# Patient Record
Sex: FEMALE | Race: WHITE | NOT HISPANIC OR LATINO
[De-identification: names, ages, dates, MRNs, and addresses within clinical notes are randomized per-mention and may not be internally consistent; named-entity substitution may affect disease eponyms.]

---

## 2017-03-22 ENCOUNTER — OTHER (OUTPATIENT)
Age: 72
End: 2017-03-22

## 2017-03-22 DIAGNOSIS — M25.569 PAIN IN UNSPECIFIED KNEE: ICD-10-CM

## 2017-03-30 ENCOUNTER — APPOINTMENT (OUTPATIENT)
Dept: ORTHOPEDIC SURGERY | Facility: CLINIC | Age: 72
End: 2017-03-30

## 2017-03-30 VITALS
HEART RATE: 80 BPM | DIASTOLIC BLOOD PRESSURE: 79 MMHG | WEIGHT: 125 LBS | BODY MASS INDEX: 23 KG/M2 | HEIGHT: 62 IN | SYSTOLIC BLOOD PRESSURE: 158 MMHG

## 2017-03-30 DIAGNOSIS — Z78.9 OTHER SPECIFIED HEALTH STATUS: ICD-10-CM

## 2017-03-30 DIAGNOSIS — Z80.42 FAMILY HISTORY OF MALIGNANT NEOPLASM OF PROSTATE: ICD-10-CM

## 2017-03-30 DIAGNOSIS — Z80.8 FAMILY HISTORY OF MALIGNANT NEOPLASM OF OTHER ORGANS OR SYSTEMS: ICD-10-CM

## 2017-03-30 DIAGNOSIS — Z86.39 PERSONAL HISTORY OF OTHER ENDOCRINE, NUTRITIONAL AND METABOLIC DISEASE: ICD-10-CM

## 2017-03-30 DIAGNOSIS — Z87.891 PERSONAL HISTORY OF NICOTINE DEPENDENCE: ICD-10-CM

## 2017-03-30 DIAGNOSIS — Z87.39 PERSONAL HISTORY OF OTHER DISEASES OF THE MUSCULOSKELETAL SYSTEM AND CONNECTIVE TISSUE: ICD-10-CM

## 2017-04-10 ENCOUNTER — FORM ENCOUNTER (OUTPATIENT)
Age: 72
End: 2017-04-10

## 2017-04-11 ENCOUNTER — OUTPATIENT (OUTPATIENT)
Dept: OUTPATIENT SERVICES | Facility: HOSPITAL | Age: 72
LOS: 1 days | End: 2017-04-11
Payer: MEDICARE

## 2017-04-11 ENCOUNTER — APPOINTMENT (OUTPATIENT)
Dept: MRI IMAGING | Facility: CLINIC | Age: 72
End: 2017-04-11

## 2017-04-11 DIAGNOSIS — Z90.89 ACQUIRED ABSENCE OF OTHER ORGANS: Chronic | ICD-10-CM

## 2017-04-11 DIAGNOSIS — M25.569 PAIN IN UNSPECIFIED KNEE: ICD-10-CM

## 2017-04-11 DIAGNOSIS — Z90.81 ACQUIRED ABSENCE OF SPLEEN: Chronic | ICD-10-CM

## 2017-04-11 DIAGNOSIS — Z98.89 OTHER SPECIFIED POSTPROCEDURAL STATES: Chronic | ICD-10-CM

## 2017-04-11 PROCEDURE — 73721 MRI JNT OF LWR EXTRE W/O DYE: CPT

## 2017-04-12 ENCOUNTER — APPOINTMENT (OUTPATIENT)
Dept: MRI IMAGING | Facility: CLINIC | Age: 72
End: 2017-04-12

## 2017-04-14 ENCOUNTER — OTHER (OUTPATIENT)
Age: 72
End: 2017-04-14

## 2017-04-24 ENCOUNTER — APPOINTMENT (OUTPATIENT)
Dept: VASCULAR SURGERY | Facility: CLINIC | Age: 72
End: 2017-04-24

## 2017-04-24 VITALS
TEMPERATURE: 97.9 F | OXYGEN SATURATION: 95 % | DIASTOLIC BLOOD PRESSURE: 91 MMHG | HEART RATE: 82 BPM | HEIGHT: 62 IN | WEIGHT: 126 LBS | SYSTOLIC BLOOD PRESSURE: 157 MMHG | RESPIRATION RATE: 16 BRPM | BODY MASS INDEX: 23.19 KG/M2

## 2017-06-12 ENCOUNTER — APPOINTMENT (OUTPATIENT)
Dept: VASCULAR SURGERY | Facility: CLINIC | Age: 72
End: 2017-06-12

## 2017-06-12 VITALS
DIASTOLIC BLOOD PRESSURE: 76 MMHG | OXYGEN SATURATION: 98 % | SYSTOLIC BLOOD PRESSURE: 119 MMHG | RESPIRATION RATE: 16 BRPM | HEART RATE: 76 BPM | WEIGHT: 126.03 LBS | TEMPERATURE: 98.2 F | BODY MASS INDEX: 23.19 KG/M2 | HEIGHT: 62 IN

## 2017-06-29 ENCOUNTER — APPOINTMENT (OUTPATIENT)
Dept: VASCULAR SURGERY | Facility: CLINIC | Age: 72
End: 2017-06-29

## 2017-06-29 VITALS
HEIGHT: 62 IN | TEMPERATURE: 98.3 F | OXYGEN SATURATION: 98 % | BODY MASS INDEX: 22.82 KG/M2 | RESPIRATION RATE: 16 BRPM | WEIGHT: 124 LBS | HEART RATE: 83 BPM | DIASTOLIC BLOOD PRESSURE: 80 MMHG | SYSTOLIC BLOOD PRESSURE: 151 MMHG

## 2017-07-24 ENCOUNTER — APPOINTMENT (OUTPATIENT)
Dept: VASCULAR SURGERY | Facility: CLINIC | Age: 72
End: 2017-07-24

## 2017-07-24 VITALS
WEIGHT: 126 LBS | OXYGEN SATURATION: 97 % | TEMPERATURE: 97.9 F | HEART RATE: 88 BPM | HEIGHT: 62 IN | BODY MASS INDEX: 23.19 KG/M2 | RESPIRATION RATE: 15 BRPM | SYSTOLIC BLOOD PRESSURE: 119 MMHG | DIASTOLIC BLOOD PRESSURE: 72 MMHG

## 2017-08-14 ENCOUNTER — APPOINTMENT (OUTPATIENT)
Dept: VASCULAR SURGERY | Facility: CLINIC | Age: 72
End: 2017-08-14
Payer: MEDICARE

## 2017-08-14 VITALS
OXYGEN SATURATION: 98 % | RESPIRATION RATE: 15 BRPM | HEART RATE: 80 BPM | DIASTOLIC BLOOD PRESSURE: 76 MMHG | WEIGHT: 127 LBS | TEMPERATURE: 97.9 F | SYSTOLIC BLOOD PRESSURE: 130 MMHG | BODY MASS INDEX: 23.37 KG/M2 | HEIGHT: 62 IN

## 2017-08-14 DIAGNOSIS — I83.811 VARICOSE VEINS OF RIGHT LOWER EXTREMITY WITH PAIN: ICD-10-CM

## 2017-08-14 PROCEDURE — 36471 NJX SCLRSNT MLT INCMPTNT VN: CPT | Mod: RT

## 2017-08-15 PROBLEM — I83.811 VARICOSE VEINS WITH PAIN, RIGHT: Status: ACTIVE | Noted: 2017-04-24

## 2017-09-05 ENCOUNTER — APPOINTMENT (OUTPATIENT)
Dept: VASCULAR SURGERY | Facility: CLINIC | Age: 72
End: 2017-09-05

## 2020-08-20 ENCOUNTER — APPOINTMENT (OUTPATIENT)
Dept: NEUROLOGY | Facility: CLINIC | Age: 75
End: 2020-08-20
Payer: MEDICARE

## 2020-08-20 VITALS — BODY MASS INDEX: 20.61 KG/M2 | WEIGHT: 112 LBS | TEMPERATURE: 97.7 F | HEIGHT: 62 IN

## 2020-08-20 DIAGNOSIS — G93.9 DISORDER OF BRAIN, UNSPECIFIED: ICD-10-CM

## 2020-08-20 PROCEDURE — 99204 OFFICE O/P NEW MOD 45 MIN: CPT

## 2020-08-20 RX ORDER — LEVOFLOXACIN 500 MG/1
500 TABLET, FILM COATED ORAL DAILY
Qty: 10 | Refills: 0 | Status: DISCONTINUED | COMMUNITY
Start: 2016-12-14 | End: 2020-08-20

## 2020-08-20 RX ORDER — CHLORHEXIDINE GLUCONATE, 0.12% ORAL RINSE 1.2 MG/ML
0.12 SOLUTION DENTAL
Qty: 1419 | Refills: 0 | Status: DISCONTINUED | COMMUNITY
Start: 2017-03-16 | End: 2020-08-20

## 2020-08-20 RX ORDER — ATORVASTATIN CALCIUM 40 MG/1
40 TABLET, FILM COATED ORAL
Qty: 30 | Refills: 0 | Status: DISCONTINUED | COMMUNITY
Start: 2016-09-19 | End: 2020-08-20

## 2020-08-20 RX ORDER — AMOXICILLIN 250 MG/1
250 CAPSULE ORAL
Qty: 30 | Refills: 0 | Status: DISCONTINUED | COMMUNITY
Start: 2017-03-16 | End: 2020-08-20

## 2020-08-20 RX ORDER — AMOXICILLIN AND CLAVULANATE POTASSIUM 875; 125 MG/1; MG/1
875-125 TABLET, COATED ORAL
Qty: 20 | Refills: 0 | Status: DISCONTINUED | COMMUNITY
Start: 2017-02-07 | End: 2020-08-20

## 2020-08-20 RX ORDER — ACETAMINOPHEN AND CODEINE 300; 30 MG/1; MG/1
300-30 TABLET ORAL
Qty: 20 | Refills: 0 | Status: DISCONTINUED | COMMUNITY
Start: 2017-03-16 | End: 2020-08-20

## 2021-01-15 ENCOUNTER — NON-APPOINTMENT (OUTPATIENT)
Age: 76
End: 2021-01-15

## 2021-02-24 ENCOUNTER — NON-APPOINTMENT (OUTPATIENT)
Age: 76
End: 2021-02-24

## 2021-02-24 ENCOUNTER — APPOINTMENT (OUTPATIENT)
Dept: GASTROENTEROLOGY | Facility: CLINIC | Age: 76
End: 2021-02-24
Payer: MEDICARE

## 2021-02-24 VITALS
HEART RATE: 78 BPM | TEMPERATURE: 98 F | BODY MASS INDEX: 21.99 KG/M2 | WEIGHT: 112 LBS | SYSTOLIC BLOOD PRESSURE: 130 MMHG | DIASTOLIC BLOOD PRESSURE: 99 MMHG | HEIGHT: 60 IN

## 2021-02-24 DIAGNOSIS — R19.7 DIARRHEA, UNSPECIFIED: ICD-10-CM

## 2021-02-24 PROCEDURE — 99072 ADDL SUPL MATRL&STAF TM PHE: CPT

## 2021-02-24 PROCEDURE — 99204 OFFICE O/P NEW MOD 45 MIN: CPT

## 2021-02-24 RX ORDER — BISMUTH SUBSALICYLATE 262 MG
TABLET,CHEWABLE ORAL
Refills: 0 | Status: ACTIVE | COMMUNITY

## 2021-02-24 RX ORDER — ATORVASTATIN CALCIUM 80 MG/1
TABLET, FILM COATED ORAL
Refills: 0 | Status: ACTIVE | COMMUNITY

## 2021-02-24 RX ORDER — IRON/IRON ASP GLY/FA/MV-MIN 38 125-25-1MG
TABLET ORAL
Refills: 0 | Status: ACTIVE | COMMUNITY

## 2021-02-24 RX ORDER — MONTELUKAST SODIUM 10 MG/1
TABLET, FILM COATED ORAL
Refills: 0 | Status: ACTIVE | COMMUNITY

## 2021-02-24 NOTE — HISTORY OF PRESENT ILLNESS
[FreeTextEntry1] : 85-year-old white female with history of diverticulosis and external hemorrhoids.  Her colonoscopies were done in 2007 and 2012 showing diverticulosis and external hemorrhoids.  She has a family history that is negative for colon cancer.  Recently identified as iron deficiency anemia.  History of hyperlipidemia on Lipitor;  history of nonlocked Hodgkin's lymphoma treated distantly 10 years ago with Rituxan.  For a relatively short course.  Hyperlipidemia treated with Lipitor and Singulair for asthma.\par Currently complaining of moderate weight loss companied by diarrhea.  Diarrhea is occurring about 4-5 times per day.  It seems to be triggered by intake of milk ice cream and shakes.  There are no associated cramps or bloating.  Diet remains good.  She has not had any recent antibiotics or foreign travel.  She denies having any rectal bleeding hemorrhoids abdominal pain distantly she was treated by Dr. Romano for non-Hodgkin's lymphoma with Rituxan on 4 occasions.

## 2021-02-24 NOTE — CONSULT LETTER
[Dear  ___] : Dear  [unfilled], [Consult Letter:] : I had the pleasure of evaluating your patient, [unfilled]. [Please see my note below.] : Please see my note below. [Referral Closing:] : Thank you very much for seeing this patient.  If you have any questions, please do not hesitate to contact me. [FreeTextEntry1] : Of acute diarrhea possibly due to lactose intolerance but additional diagnoses are still possible.  Patient will employ a lactose-free diet and if symptoms remain then further work-up indicated.  Would include blood work and stool studies and GI office reevaluation for possible colonoscopy. [Sincerely,] : Sincerely, [FreeTextEntry3] : Minor Rosenthal MD FACG\par Diplomate American Board of Internal Medicine and Gastroenterolgy\par [facility]\par

## 2021-02-24 NOTE — ASSESSMENT
[FreeTextEntry1] : 75-year-old white female with history of non-Hodgkin's lymphoma treated and resolved 10 years ago.  No with alleged iron deficiency anemia.  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  Her diarrhea symptoms are consistent with that of lactose intolerance.  Patient will avoid milk and switch to lactose-free milk.  I will of use of Lactaid tablets.  Future blood work requested to include ESR and CRP plus CBC CMP, TSH, T3-T4..  Stool studies for C. difficile, CNS, O&P, Giardia, Cruz stain.\par GI office follow-up in 2 months.\par Last endoscopy in 2008 showed small gastric polyps which were benign on pathology.\par Prior colonoscopies in 2007 and 2012 just showed diverticulosis and external hemorrhoids.  Considering repeat colonoscopy if above evaluation is negative and symptoms persist.

## 2021-02-24 NOTE — PHYSICAL EXAM
[General Appearance - Alert] : alert [General Appearance - In No Acute Distress] : in no acute distress [Sclera] : the sclera and conjunctiva were normal [PERRL With Normal Accommodation] : pupils were equal in size, round, and reactive to light [Extraocular Movements] : extraocular movements were intact [Oropharynx] : the oropharynx was normal [Outer Ear] : the ears and nose were normal in appearance [Neck Appearance] : the appearance of the neck was normal [Jugular Venous Distention Increased] : there was no jugular-venous distention [Neck Cervical Mass (___cm)] : no neck mass was observed [Thyroid Diffuse Enlargement] : the thyroid was not enlarged [Thyroid Nodule] : there were no palpable thyroid nodules [Auscultation Breath Sounds / Voice Sounds] : lungs were clear to auscultation bilaterally [Heart Rate And Rhythm] : heart rate was normal and rhythm regular [Heart Sounds] : normal S1 and S2 [Murmurs] : no murmurs [Heart Sounds Gallop] : no gallops [Heart Sounds Pericardial Friction Rub] : no pericardial rub [Bowel Sounds] : normal bowel sounds [Abdomen Tenderness] : non-tender [Abdomen Soft] : soft [] : no hepato-splenomegaly [Abdomen Mass (___ Cm)] : no abdominal mass palpated [FreeTextEntry1] : \par Surgical scar.  No hernia.  No adenopathy.  No ascites. [Cervical Lymph Nodes Enlarged Posterior Bilaterally] : posterior cervical [Cervical Lymph Nodes Enlarged Anterior Bilaterally] : anterior cervical [Axillary Lymph Nodes Enlarged Bilaterally] : axillary [Supraclavicular Lymph Nodes Enlarged Bilaterally] : supraclavicular [Femoral Lymph Nodes Enlarged Bilaterally] : femoral [Inguinal Lymph Nodes Enlarged Bilaterally] : inguinal

## 2021-02-24 NOTE — REASON FOR VISIT
[Consultation] : a consultation visit [FreeTextEntry1] : Subacute diarrhea.  Iron deficiency anemia.

## 2021-05-07 ENCOUNTER — APPOINTMENT (OUTPATIENT)
Dept: GASTROENTEROLOGY | Facility: CLINIC | Age: 76
End: 2021-05-07

## 2022-04-17 ENCOUNTER — EMERGENCY (EMERGENCY)
Facility: HOSPITAL | Age: 77
LOS: 1 days | Discharge: HOME | End: 2022-04-17
Attending: EMERGENCY MEDICINE
Payer: MEDICARE

## 2022-04-17 VITALS
DIASTOLIC BLOOD PRESSURE: 95 MMHG | RESPIRATION RATE: 18 BRPM | WEIGHT: 130.07 LBS | HEIGHT: 64 IN | SYSTOLIC BLOOD PRESSURE: 165 MMHG | TEMPERATURE: 98 F | OXYGEN SATURATION: 100 % | HEART RATE: 80 BPM

## 2022-04-17 DIAGNOSIS — Z90.81 ACQUIRED ABSENCE OF SPLEEN: Chronic | ICD-10-CM

## 2022-04-17 DIAGNOSIS — Z98.89 OTHER SPECIFIED POSTPROCEDURAL STATES: Chronic | ICD-10-CM

## 2022-04-17 DIAGNOSIS — Z90.89 ACQUIRED ABSENCE OF OTHER ORGANS: Chronic | ICD-10-CM

## 2022-04-17 LAB
BASOPHILS # BLD AUTO: 0.07 K/UL — SIGNIFICANT CHANGE UP (ref 0–0.2)
BASOPHILS NFR BLD AUTO: 0.5 % — SIGNIFICANT CHANGE UP (ref 0–2)
EOSINOPHIL # BLD AUTO: 0.03 K/UL — SIGNIFICANT CHANGE UP (ref 0–0.5)
EOSINOPHIL NFR BLD AUTO: 0.2 % — SIGNIFICANT CHANGE UP (ref 0–6)
HCT VFR BLD CALC: 37.7 % — SIGNIFICANT CHANGE UP (ref 34.5–45)
HGB BLD-MCNC: 12 G/DL — SIGNIFICANT CHANGE UP (ref 11.5–15.5)
IMM GRANULOCYTES NFR BLD AUTO: 0.4 % — SIGNIFICANT CHANGE UP (ref 0–1.5)
LYMPHOCYTES # BLD AUTO: 0.85 K/UL — LOW (ref 1–3.3)
LYMPHOCYTES # BLD AUTO: 6.4 % — LOW (ref 13–44)
MCHC RBC-ENTMCNC: 31.8 GM/DL — LOW (ref 32–36)
MCHC RBC-ENTMCNC: 32.2 PG — SIGNIFICANT CHANGE UP (ref 27–34)
MCV RBC AUTO: 101.1 FL — HIGH (ref 80–100)
MONOCYTES # BLD AUTO: 0.38 K/UL — SIGNIFICANT CHANGE UP (ref 0–0.9)
MONOCYTES NFR BLD AUTO: 2.8 % — SIGNIFICANT CHANGE UP (ref 2–14)
NEUTROPHILS # BLD AUTO: 11.96 K/UL — HIGH (ref 1.8–7.4)
NEUTROPHILS NFR BLD AUTO: 89.7 % — HIGH (ref 43–77)
PLATELET # BLD AUTO: 168 K/UL — SIGNIFICANT CHANGE UP (ref 150–400)
RBC # BLD: 3.73 M/UL — LOW (ref 3.8–5.2)
RBC # FLD: 13.3 % — SIGNIFICANT CHANGE UP (ref 10.3–14.5)
WBC # BLD: 13.35 K/UL — HIGH (ref 3.8–10.5)
WBC # FLD AUTO: 13.35 K/UL — HIGH (ref 3.8–10.5)

## 2022-04-17 PROCEDURE — 99284 EMERGENCY DEPT VISIT MOD MDM: CPT

## 2022-04-17 RX ORDER — SODIUM CHLORIDE 9 MG/ML
1000 INJECTION INTRAMUSCULAR; INTRAVENOUS; SUBCUTANEOUS ONCE
Refills: 0 | Status: COMPLETED | OUTPATIENT
Start: 2022-04-17 | End: 2022-04-17

## 2022-04-17 RX ORDER — ONDANSETRON 8 MG/1
4 TABLET, FILM COATED ORAL ONCE
Refills: 0 | Status: COMPLETED | OUTPATIENT
Start: 2022-04-17 | End: 2022-04-17

## 2022-04-17 RX ADMIN — ONDANSETRON 4 MILLIGRAM(S): 8 TABLET, FILM COATED ORAL at 20:35

## 2022-04-17 RX ADMIN — SODIUM CHLORIDE 2000 MILLILITER(S): 9 INJECTION INTRAMUSCULAR; INTRAVENOUS; SUBCUTANEOUS at 22:59

## 2022-04-17 NOTE — ED PROVIDER NOTE - NSFOLLOWUPINSTRUCTIONS_ED_ALL_ED_FT
Preventing Marijuana Misuse      Marijuana is a mixture of the dried leaves and flowers of the hemp plant Cannabis sativa. The plant's active ingredients (cannabinoids) change the chemistry of the brain. If you smoke or eat marijuana, you will experience changes in the way you think, feel, and behave.      What is marijuana used for?    In some cases, marijuana is prescribed by a health care provider (medical marijuana) for temporary relief from a medical condition. It can have medical effects, such as:  •Reduced nausea.       •Increased appetite.       •Reduced muscle spasm.       •Pain relief.      •Anxiety relief.      Many people use marijuana for recreational purposes because it helps them relax and puts them in a pleasurable mood (marijuana high).      How can marijuana use affect me?    Marijuana affects you both mentally and physically. Using marijuana can make you feel high and relaxed. It can also have negative effects, both short term and long term. When used for recreational purposes, marijuana is often used in higher doses and for longer periods. High doses of marijuana can cause unpleasant side effects. It is also possible to become addicted to this drug.    Short-term effects of marijuana use include:  •Changes in mood and perception, such as an altered sense of time.      •Increased heart rate.      •Increased appetite.      •Slowed movement, coordination, and reaction time.      •Poor memory, judgment, and problem-solving ability.      •Drowsiness.      •Bloodshot eyes and changes in vision.      •Coughing.      High doses of marijuana can cause:  •Panic.       •Anxiety.       •Mental confusion.      •Severe dizziness.      •Vomiting.      •Hallucinations. This means you see, hear, taste, smell, or feel things that are not real.      •Coma.        What can happen if I keep using marijuana?    If you use marijuana for a long time, it can have long-term effects such as:•Higher risk of health problems, such as:   •Lung and breathing problems.       •Possible higher risk of heart and cardiovascular problems or testicular cancer.        •Mental and physical dependence (addiction).      •Slowed brain development in young people. Babies whose mothers used marijuana during pregnancy may have an increased risk of problems with brain development and behavior.      •Hallucinations or temporary periods of false perceptions or beliefs (paranoia).      •Worsening of mental illness or onset of new mental illness. This can include anxiety, depression, or suicidal thoughts.      •Difficulty maintaining healthy relationships.       •Poor memory and difficulty concentrating and learning. This can result in decreased intelligence, poor performance at school or work, and an increased risk of dropping out of school.      •Higher risk of using other substances like alcohol, nicotine, and illegal drugs.      •A condition called cannabinoid hyperemesis syndrome. This causes repeated episodes of intense abdominal pain, nausea, and vomiting.      Quitting marijuana after using it for a long time can cause withdrawal symptoms, such as:  •Headache.       •Shakiness.       •Cravings for the drug.       •Sweating.       •Stomach pain, nausea, and vomiting.      •Restlessness and trouble sleeping.       •Anxiety, irritability, and anger.       •Decreased appetite.        What are the benefits of not using marijuana?    Not using marijuana can keep you from becoming dependent on it. You can avoid the drug's negative effects on your quality of life. You can avoid accidents caused by the slowed reaction time and decreased thinking ability that are common with marijuana use and abuse. You can also prevent the long-term effects that this drug can cause.      What actions can I take to stop using marijuana?     If you are not physically or mentally dependent on marijuana, you should be able to stop using it on your own. Taking these actions may help:  •Find healthy ways to cope with stress, such as exercise, meditation, or spending time with family and friends. Talk with your health care provider about how you feel and how to cope with stress.       •Spend time with people who do not use marijuana, or make new friends who do not use marijuana.       •Do something else instead of using marijuana. You can exercise, take up a hobby, or participate in activities that you can do with others.       •Do not be afraid to say no if someone offers you marijuana. Speak up about why you do not want to use drugs. You can be a positive role model for others.      If you cannot stop on your own, ask your health care provider for help. Treatment for marijuana addiction is similar to treatment for other addictions. It may include:  •Cognitive-behavioral therapy (psychotherapy). This may include individual or group therapy.      •Joining a support group.       •Treating medical, behavioral, or mental health conditions that exist along with marijuana dependency.        Where to find more information    Learn more about:  •Marijuana from the [place] on Drug Abuse: www.drugabuse.gov      •Medical marijuana from the National Institutes of Health: nccih.nih.gov      •Treatment options from the Substance Abuse and Mental Health Services Administration: [facility].gov      •Recovery from marijuana dependency from Recovery.org: www.recovery.org        Contact a health care provider if:    •You want to stop using marijuana but you cannot.      •You have withdrawal symptoms when you try to stop using marijuana.      •You are using marijuana every day.      •You need to use increasing amounts of marijuana to get the same desired effect.      •You are using marijuana along with other drugs like cocaine or alcohol.      •You have anxiety or depression.      •You have hallucinations or paranoia.      •Marijuana use is interfering with your relationships or your ability to function normally at school or at work.        Get help right away if:    •You develop severe chest pain, dizziness, or shortness of breath.      •You have severe abdominal pain, nausea, and vomiting.        Summary    •Using marijuana can make you feel high and relaxed, and if used properly, it can have some medical benefits. However, it can also have negative effects, both short term and long term.      •High doses of marijuana can cause unpleasant side effects. These can be both physical and mental.      •Marijuana has the potential to cause physical dependence and even addiction.      •Long-term use may interfere with your ability to function normally at home, school, or work. It can sometimes lead to using other substances like alcohol, nicotine, and illegal drugs.      •If you need help to stop using marijuana, ask your health care provider. Marijuana addiction can be treated.      This information is not intended to replace advice given to you by your health care provider. Make sure you discuss any questions you have with your health care provider.

## 2022-04-17 NOTE — ED ADULT TRIAGE NOTE - CHIEF COMPLAINT QUOTE
pt BIBA after eating a chocolate edible from grandson. pt reports then started to vomit and feel tired. pt a&ox3

## 2022-04-17 NOTE — ED PROVIDER NOTE - PATIENT PORTAL LINK FT
You can access the FollowMyHealth Patient Portal offered by [facility] by registering at the following website: [email or web address]. By joining [facility]’s FollowMyHealth portal, you will also be able to view your health information using other applications (apps) compatible with our system.

## 2022-04-17 NOTE — ED ADULT NURSE NOTE - OBJECTIVE STATEMENT
76 year old female presents to ED for evaluation after ingesting a "edible" that she got from her grandson.  A&Ox4  Presents now with complaint of dizziness.  Seen and evaluated by provider, awaiting orders.  No acute distress noted at this time.   at bedside.  Offers no complaints.

## 2022-04-17 NOTE — ED PROVIDER NOTE - PROGRESS NOTE DETAILS
JK - VSS, resting comfortably, in no apparent distress. Ambulating on her own without assistance, tolerating PO. CBC, CMP WNL. aaox3, no FND, no ataxia. Ready and agreeable to DC with return precautions.

## 2022-04-17 NOTE — ED ADULT NURSE NOTE - NSICDXPASTMEDICALHX_GEN_ALL_CORE_FT
PAST MEDICAL HISTORY:  Diverticulitis     High cholesterol     Lymphoma in remission 5 years    Simple chronic bronchitis

## 2022-04-18 VITALS
TEMPERATURE: 98 F | SYSTOLIC BLOOD PRESSURE: 142 MMHG | OXYGEN SATURATION: 94 % | RESPIRATION RATE: 18 BRPM | HEART RATE: 86 BPM | DIASTOLIC BLOOD PRESSURE: 80 MMHG

## 2022-04-18 LAB
ANION GAP SERPL CALC-SCNC: 12 MMOL/L — SIGNIFICANT CHANGE UP (ref 5–17)
BUN SERPL-MCNC: 14.5 MG/DL — SIGNIFICANT CHANGE UP (ref 8–20)
CALCIUM SERPL-MCNC: 8.7 MG/DL — SIGNIFICANT CHANGE UP (ref 8.6–10.2)
CHLORIDE SERPL-SCNC: 105 MMOL/L — SIGNIFICANT CHANGE UP (ref 98–107)
CO2 SERPL-SCNC: 26 MMOL/L — SIGNIFICANT CHANGE UP (ref 22–29)
CREAT SERPL-MCNC: 0.41 MG/DL — LOW (ref 0.5–1.3)
EGFR: 101 ML/MIN/1.73M2 — SIGNIFICANT CHANGE UP
GLUCOSE SERPL-MCNC: 133 MG/DL — HIGH (ref 70–99)
POTASSIUM SERPL-MCNC: 3.9 MMOL/L — SIGNIFICANT CHANGE UP (ref 3.5–5.3)
POTASSIUM SERPL-SCNC: 3.9 MMOL/L — SIGNIFICANT CHANGE UP (ref 3.5–5.3)
SODIUM SERPL-SCNC: 143 MMOL/L — SIGNIFICANT CHANGE UP (ref 135–145)

## 2022-04-18 PROCEDURE — 99283 EMERGENCY DEPT VISIT LOW MDM: CPT

## 2022-04-18 PROCEDURE — 80048 BASIC METABOLIC PNL TOTAL CA: CPT

## 2022-04-18 PROCEDURE — 85025 COMPLETE CBC W/AUTO DIFF WBC: CPT

## 2022-04-18 PROCEDURE — 36415 COLL VENOUS BLD VENIPUNCTURE: CPT

## 2022-04-18 RX ORDER — ACETAMINOPHEN 500 MG
650 TABLET ORAL ONCE
Refills: 0 | Status: COMPLETED | OUTPATIENT
Start: 2022-04-18 | End: 2022-04-18

## 2022-04-18 RX ADMIN — Medication 650 MILLIGRAM(S): at 02:08

## 2022-12-06 ENCOUNTER — OFFICE (OUTPATIENT)
Dept: URBAN - METROPOLITAN AREA CLINIC 115 | Facility: CLINIC | Age: 77
Setting detail: OPHTHALMOLOGY
End: 2022-12-06
Payer: MEDICARE

## 2022-12-06 DIAGNOSIS — Z96.1: ICD-10-CM

## 2022-12-06 DIAGNOSIS — H40.033: ICD-10-CM

## 2022-12-06 DIAGNOSIS — H11.153: ICD-10-CM

## 2022-12-06 PROCEDURE — 92012 INTRM OPH EXAM EST PATIENT: CPT | Performed by: OPHTHALMOLOGY

## 2022-12-06 ASSESSMENT — REFRACTION_CURRENTRX
OS_ADD: +2.75
OD_SPHERE: +6.50
OD_AXIS: 164
OS_VPRISM_DIRECTION: PROGS
OD_VPRISM_DIRECTION: PROGS
OS_ADD: +2.50
OD_VPRISM_DIRECTION: PROGS
OS_CYLINDER: -2.25
OS_SPHERE: +6.50
OS_SPHERE: +7.25
OD_OVR_VA: 20/
OD_CYLINDER: -1.50
OD_AXIS: 164
OD_OVR_VA: 20/
OD_ADD: +2.75
OS_AXIS: 177
OS_AXIS: 177
OD_ADD: +2.50
OS_VPRISM_DIRECTION: PROGS
OD_SPHERE: +7.25
OS_CYLINDER: -2.25
OD_CYLINDER: -1.75
OS_OVR_VA: 20/
OS_OVR_VA: 20/

## 2022-12-06 ASSESSMENT — REFRACTION_AUTOREFRACTION
OD_AXIS: 149
OD_SPHERE: +1.50
OD_CYLINDER: -1.25
OS_AXIS: 163
OS_SPHERE: +1.25
OS_CYLINDER: -2.25

## 2022-12-06 ASSESSMENT — REFRACTION_MANIFEST
OU_VA: 20/30-
OD_ADD: +3.00
OS_ADD: +3.00
OS_AXIS: 157
OD_AXIS: 166
OS_ADD: +2.75
OS_VA1: 20/20
OD_SPHERE: +6.25
OD_CYLINDER: -1.25
OD_CYLINDER: -1.50
OD_AXIS: 160
OS_VA1: 20/30-
OS_CYLINDER: -2.25
OU_VA: 20/20
OD_SPHERE: +1.25
OS_SPHERE: +0.75
OD_VA1: 20/25+1
OS_SPHERE: +6.25
OD_ADD: +2.75
OS_CYLINDER: -1.50
OD_VA1: 20/30-1
OS_AXIS: 175

## 2022-12-06 ASSESSMENT — CONFRONTATIONAL VISUAL FIELD TEST (CVF)
OS_FINDINGS: FULL
OD_FINDINGS: FULL

## 2022-12-06 ASSESSMENT — SPHEQUIV_DERIVED
OS_SPHEQUIV: 0.125
OD_SPHEQUIV: 0.625
OS_SPHEQUIV: 5.125
OD_SPHEQUIV: 5.5
OS_SPHEQUIV: 0
OD_SPHEQUIV: 0.875

## 2022-12-06 ASSESSMENT — VISUAL ACUITY
OD_BCVA: 20/30
OS_BCVA: 20/50-1

## 2022-12-06 ASSESSMENT — TONOMETRY
OD_IOP_MMHG: 10
OS_IOP_MMHG: 14

## 2023-08-14 ENCOUNTER — OFFICE (OUTPATIENT)
Dept: URBAN - METROPOLITAN AREA CLINIC 52 | Facility: CLINIC | Age: 78
Setting detail: OPHTHALMOLOGY
End: 2023-08-14
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
## 2023-08-14 PROCEDURE — NO SHOW FE NO SHOW FEE: Performed by: OPHTHALMOLOGY

## 2024-01-08 ENCOUNTER — APPOINTMENT (OUTPATIENT)
Dept: NEUROSURGERY | Facility: CLINIC | Age: 79
End: 2024-01-08
Payer: MEDICARE

## 2024-01-08 VITALS
HEIGHT: 61 IN | HEART RATE: 88 BPM | WEIGHT: 120 LBS | SYSTOLIC BLOOD PRESSURE: 115 MMHG | BODY MASS INDEX: 22.66 KG/M2 | OXYGEN SATURATION: 93 % | DIASTOLIC BLOOD PRESSURE: 72 MMHG

## 2024-01-08 DIAGNOSIS — G31.84 MILD COGNITIVE IMPAIRMENT, SO STATED: ICD-10-CM

## 2024-01-08 PROCEDURE — 99204 OFFICE O/P NEW MOD 45 MIN: CPT

## 2024-01-08 RX ORDER — TIZANIDINE HYDROCHLORIDE 4 MG/1
4 CAPSULE ORAL EVERY 6 HOURS
Qty: 120 | Refills: 1 | Status: ACTIVE | COMMUNITY
Start: 2024-01-08 | End: 1900-01-01

## 2024-01-08 RX ORDER — METHYLPREDNISOLONE 4 MG/1
4 TABLET ORAL
Qty: 1 | Refills: 0 | Status: ACTIVE | COMMUNITY
Start: 2024-01-08 | End: 1900-01-01

## 2024-01-08 NOTE — ASSESSMENT
[FreeTextEntry1] : Ms. Aparna Beck is a very pleasant 78 year old female with PMH of brain surgery in 2019, lumbar surgery many years ago who presents for left sided low back pain for the past 3 weeks. I discussed with her, her , and granddaughter my recommendation for conservative management at this time in the form of physical therapy, pain management (for possible injections). For her pain, I have prescribed her a medrol dose pack and a muscle relaxer to see if this helps. Give her history of brain surgery and recent memory issues, I would also like to order a head CT and brain MRI to assess for any structural issues. She will follow-up in 6 weeks with me. They know to call my office with any questions or concerns. All questions answered.

## 2024-01-08 NOTE — HISTORY OF PRESENT ILLNESS
[de-identified] : Ms. Aparna Beck is a very pleasant 78 year old female with PMH of brain surgery in 2019, lumbar surgery many years ago who presents for left sided low back pain for the past 3 weeks. She is here with her  and granddaughter Young who help with the history. She has had chronic low back pain for many years, but about 3 weeks began having a severe episode of left lower back pain. The pain has been so severe that she has not been able to do much. She normally is quite active at home and at work, but she has been bedridden because of the pain. She went to the ER at [facility] on Sador[place] Day and was started on percocet, which barely helps. She reports the pain to be severe, 10 out of 10. She has daily radicular pain that shoots down the side of her leg, that is also rated 10 out of 10. She is unable to target any inciting movements. Her granddaughter also notes that she has some right sided pain as well. She denies any numbness/tingling, weakness, bowel/bladder symptoms. She has not had any recent injections or physical therapy. In the ER on Sadorus Day, she did receive a toradol injection.   Of note, her granddaughter and  also note significant memory issues.   PMH: high cholesterol PSH: tonsillectomy, brain surgery, lumbar surgery ( states this was decades ago, unclear if 1 or 2 surgeries)

## 2024-01-26 ENCOUNTER — APPOINTMENT (OUTPATIENT)
Dept: NEUROSURGERY | Facility: CLINIC | Age: 79
End: 2024-01-26
Payer: MEDICARE

## 2024-01-26 VITALS
SYSTOLIC BLOOD PRESSURE: 121 MMHG | WEIGHT: 110 LBS | OXYGEN SATURATION: 97 % | TEMPERATURE: 98 F | DIASTOLIC BLOOD PRESSURE: 79 MMHG | HEIGHT: 61 IN | BODY MASS INDEX: 20.77 KG/M2 | HEART RATE: 102 BPM

## 2024-01-26 PROCEDURE — 99213 OFFICE O/P EST LOW 20 MIN: CPT

## 2024-01-26 RX ORDER — LIDOCAINE 5% 700 MG/1
5 PATCH TOPICAL
Qty: 30 | Refills: 0 | Status: ACTIVE | COMMUNITY
Start: 2024-01-26 | End: 1900-01-01

## 2024-01-26 NOTE — ASSESSMENT
[FreeTextEntry1] : Ms. Aparna Beck is a very pleasant 78 year old female with PMH of brain surgery in 2019, lumbar surgery many years ago who presents for follow-up of left sided low back pain since December.  I reviewed her MRI with her, her , and her daughter which demonstrates multilevel degenerative changes and severe foraminal stenosis that could be the cause of her leg pain.  I discussed with them that for her back pain, because midline back pain can be caused by instability from the retrolisthesis seen on her MRI, my recommendation is nonsurgical therapy as surgery to attempt to decrease her back pain would involve a multilevel revision fusion surgery which I think she is too high risk for.  They are in agreement with this.  With regards to her leg pain, I discussed with him that this could be from the foraminal stenosis seen on her MRI however I would like her to go back to see pain management to see if they can do some targeted epidural steroid injections at L1-L2, L2-L3, L3-L4.  They have an appointment later today.  I will send my note to them.  I preliminarily discussed that surgery is an option for her foraminal stenosis, but they would like to avoid surgery at this time.  Additionally on her MRI, she has a sacral fracture of unknown age.  I would like to get a CT of her lumbar spine to further characterize this as it was not seen on her prior CT. have also prescribed some physical therapy and lidocaine patches to see if this will help her pain.  All questions answered.  I would like to see her back in 6 weeks.  They know to call my office with any issues or concerns.

## 2024-01-26 NOTE — HISTORY OF PRESENT ILLNESS
[FreeTextEntry1] : Ms. Aparna Beck is a very pleasant 78 year old female with PMH of brain surgery in 2019, lumbar surgery many years ago who presents for follow-up of left sided low back pain since December.  She was initially seen in the office on January 8, 2024.  At that time, she was recommended to undergo MRI of her lumbar spine as well as to see pain management, which she has done.  She continues to have severe low back and bilateral pain down her legs.  The pain seems to go down the sides of her legs.  Although currently she reports left-sided pain, her family notes that she also complains of right-sided pain.  She seems to complain of her leg pain more than her back pain.  No new symptoms.

## 2024-02-21 ENCOUNTER — APPOINTMENT (OUTPATIENT)
Dept: NEUROSURGERY | Facility: CLINIC | Age: 79
End: 2024-02-21

## 2024-02-28 ENCOUNTER — APPOINTMENT (OUTPATIENT)
Dept: NEUROSURGERY | Facility: CLINIC | Age: 79
End: 2024-02-28
Payer: SELF-PAY

## 2024-02-28 VITALS
WEIGHT: 115 LBS | OXYGEN SATURATION: 97 % | HEART RATE: 105 BPM | BODY MASS INDEX: 21.16 KG/M2 | DIASTOLIC BLOOD PRESSURE: 85 MMHG | SYSTOLIC BLOOD PRESSURE: 132 MMHG | HEIGHT: 62 IN | TEMPERATURE: 98.5 F

## 2024-02-28 PROCEDURE — 99213 OFFICE O/P EST LOW 20 MIN: CPT

## 2024-02-28 RX ORDER — LIDOCAINE 5% 700 MG/1
5 PATCH TOPICAL
Qty: 30 | Refills: 2 | Status: ACTIVE | COMMUNITY
Start: 2024-02-28 | End: 1900-01-01

## 2024-02-28 NOTE — ASSESSMENT
[FreeTextEntry1] : Ms. Aparna Beck is a very pleasant 78 year old female with PMH of brain surgery in 2019, lumbar surgery many years ago who presents for follow-up of low back pain and occasional leg since December.  With the assistance from pain management, her pain has significantly improved and she is back to walking her dog and going to the grocery store with her .  She will continue to follow-up with pain management and neurology.  I am very pleased with her progress.  I discussed with her granddaughter, Young and her  that her sacral fracture will continue to heal on its own but she should supplement with vitamin D and calcium.  I will see her back in 6 weeks.  All questions answered.  They know to call my office with any issues or concerns.

## 2024-02-28 NOTE — HISTORY OF PRESENT ILLNESS
[de-identified] : Ms. Aparna Beck is a very pleasant 78 year old female with PMH of brain surgery in 2019, lumbar surgery many years ago who presents for follow-up of low back pain and occasional leg since December.  Since her last visit she has undergone injections with pain management and had medications adjusted which have significantly improved her pain.  She is also using lidocaine patches which help.  During her prior visits, she was in a wheelchair due to how severe the pain is but she is able to go to the store now and walk her dog.  She still has some occasional pain and so some days are better than others.  She is seeing Dr. Alejo from neurology.  Her granddaughter Young reports that her liver enzymes were recently elevated due her to her Crestor and so they are working this up.

## 2024-02-28 NOTE — PHYSICAL EXAM
[de-identified] : Sitting in chair on her own, no wheelchair BLE 4+ throughout Some back pain just above gluteal crease

## 2024-02-28 NOTE — REASON FOR VISIT
[Follow-Up Visit] : a follow-up visit for [Spouse] : spouse [Back Pain] : back pain [Family Member] : family member

## 2024-03-29 ENCOUNTER — APPOINTMENT (OUTPATIENT)
Dept: NEUROSURGERY | Facility: CLINIC | Age: 79
End: 2024-03-29
Payer: MEDICARE

## 2024-03-29 VITALS
WEIGHT: 110 LBS | BODY MASS INDEX: 20.12 KG/M2 | TEMPERATURE: 98.2 F | DIASTOLIC BLOOD PRESSURE: 75 MMHG | SYSTOLIC BLOOD PRESSURE: 135 MMHG | OXYGEN SATURATION: 93 % | HEART RATE: 108 BPM

## 2024-03-29 DIAGNOSIS — K92.1 MELENA: ICD-10-CM

## 2024-03-29 PROCEDURE — 99213 OFFICE O/P EST LOW 20 MIN: CPT

## 2024-03-29 NOTE — ASSESSMENT
[FreeTextEntry1] : Ms. Aparna Beck is a very pleasant 78 year old female with PMH of brain surgery in 2019, lumbar surgery many years ago who presents for follow-up of low back pain and occasional leg pain since December.  She is continuing physical therapy and pain management and her pain is overall stable.  She should continue conservative management.  She is continuing to walk daily.  I am pleased with her progress.  Of note, she has been having tarry stools and has always had GI issues so I have ordered a Hemoccult as well as referred her to gastroenterology.  I will see her back in 6 weeks.  All questions answered.  She knows to call my office with any issues or concerns.

## 2024-03-29 NOTE — REASON FOR VISIT
[FreeTextEntry1] : Ms. Aparna Beck is a very pleasant 78 year old female with PMH of brain surgery in 2019, lumbar surgery many years ago who presents for follow-up of low back pain and occasional leg pain since December that has been managed conservatively with pain management and physical therapy.  I last saw her in February and at that time she was doing better.  She continues to be doing well.  She still has some back pain but she had an injection 2 weeks ago that has eased the pain.  She still able to walk her dog and she reports that she walks 10-12 blocks a day.  She is set to see pain management next week.

## 2024-05-07 ENCOUNTER — EMERGENCY (EMERGENCY)
Facility: HOSPITAL | Age: 79
LOS: 1 days | Discharge: HOME | End: 2024-05-07
Attending: EMERGENCY MEDICINE
Payer: MEDICARE

## 2024-05-07 VITALS
OXYGEN SATURATION: 98 % | WEIGHT: 102.96 LBS | RESPIRATION RATE: 17 BRPM | HEART RATE: 92 BPM | SYSTOLIC BLOOD PRESSURE: 150 MMHG | TEMPERATURE: 99 F | DIASTOLIC BLOOD PRESSURE: 79 MMHG

## 2024-05-07 DIAGNOSIS — Z98.89 OTHER SPECIFIED POSTPROCEDURAL STATES: Chronic | ICD-10-CM

## 2024-05-07 DIAGNOSIS — Z90.81 ACQUIRED ABSENCE OF SPLEEN: Chronic | ICD-10-CM

## 2024-05-07 DIAGNOSIS — Z90.89 ACQUIRED ABSENCE OF OTHER ORGANS: Chronic | ICD-10-CM

## 2024-05-07 PROCEDURE — 99284 EMERGENCY DEPT VISIT MOD MDM: CPT | Mod: 25

## 2024-05-07 PROCEDURE — 73610 X-RAY EXAM OF ANKLE: CPT | Mod: 26,RT

## 2024-05-07 PROCEDURE — 99284 EMERGENCY DEPT VISIT MOD MDM: CPT

## 2024-05-07 PROCEDURE — 93971 EXTREMITY STUDY: CPT | Mod: 26,RT

## 2024-05-07 PROCEDURE — 73590 X-RAY EXAM OF LOWER LEG: CPT | Mod: 26,RT

## 2024-05-07 PROCEDURE — 93971 EXTREMITY STUDY: CPT

## 2024-05-07 PROCEDURE — 73590 X-RAY EXAM OF LOWER LEG: CPT

## 2024-05-07 PROCEDURE — 73610 X-RAY EXAM OF ANKLE: CPT

## 2024-05-07 NOTE — ED ADULT NURSE NOTE - OBJECTIVE STATEMENT
pt AOx4, breathing even and unlabored. assumed care 1445. pt arrives to ED w/bruising and abrasion to R heel of foot and L shin. pt denies falls, injuries, unsure of how she injured lower extremities. +pulse, ambulating independently.

## 2024-05-07 NOTE — ED PROVIDER NOTE - PATIENT PORTAL LINK FT
You can access the FollowMyHealth Patient Portal offered by [facility] by registering at the following website: [email or web address]. By joining [facility]’s FollowMyHealth portal, you will also be able to view your health information using other applications (apps) compatible with our system.

## 2024-05-07 NOTE — ED PROVIDER NOTE - ADDITIONAL NOTES AND INSTRUCTIONS:
PT was evaluated At [facility] ED and was found to have a condition that warranted time of to rest and heal from WORK/SCHOOL.   Mauricio Chamorro PA-C

## 2024-05-07 NOTE — ED PROVIDER NOTE - OBJECTIVE STATEMENT
Patient with significant past medical history of dementia presents emergency room for evaluation of right lower leg contusion.  Patient family states they do not know what happened not so concerned patient has been ambulate without issue but due to bruising he would like to be evaluated.  Patient denies numbness tingling weakness loss sensation headache weakness back pain.

## 2024-05-07 NOTE — ED ADULT NURSE NOTE - NSFALLUNIVINTERV_ED_ALL_ED
Bed/Stretcher in lowest position, wheels locked, appropriate side rails in place/Call bell, personal items and telephone in reach/Instruct patient to call for assistance before getting out of bed/chair/stretcher/Non-slip footwear applied when patient is off stretcher/[place] to call system/Physically safe environment - no spills, clutter or unnecessary equipment/Purposeful proactive rounding/Room/bathroom lighting operational, light cord in reach

## 2024-05-07 NOTE — ED PROVIDER NOTE - CLINICAL SUMMARY MEDICAL DECISION MAKING FREE TEXT BOX
Patient with significant past medical history of dementia presents emergency room for evaluation of right lower leg contusion.  Patient family states they do not know what happened not so concerned patient has been ambulate without issue but due to bruising he would like to be evaluated.  Patient denies numbness tingling weakness loss sensation headache weakness back pain. Pt with bruisng on lower leg likley 4x4cm hematoma at the medial aspect of lower leg with mild ttp, no s/s of infection, Pt cleared for dc home with supportive care, follow up to PCP, Pt educated about when to return to the ED if needed. PT verbalizes that he understands all instructions and results. Pt infomred that ED is open and availible 24/7 365 days a yr, encouraged to return to the ED if they have any change in condition, or feel the need for revaluation.

## 2024-05-07 NOTE — ED PROVIDER NOTE - NSFOLLOWUPINSTRUCTIONS_ED_ALL_ED_FT
Patient education: Taking care of bruises (The Basics)  Written by the doctors and editors at [facility]  Please read the Disclaimer at the end of this page.    What are bruises?  Bruises happen when blood vessels under the skin break, but the skin isn't cut. Blood leaks into the tissues under the skin. Bruises start off red in color, and then turn blue or purple. As they heal, bruises can turn green and yellow (figure 1). Most bruises heal in 1 to 2 weeks, but some take longer.    Bruises can happen when people get hurt, fall, or bump themselves. People usually have pain and swelling in the area of the bruise. Sometimes, the swelling happens right away. Other times, the swelling starts 1 or 2 days later.    Some people bruise more easily and get worse bruises. These include people who have conditions that keep the blood from clotting normally and people who take medicines to prevent blood clots.    How are bruises treated?  A bruise will get better on its own. But to feel better and help your bruise heal, you can:    ?Put a cold gel pack, bag of ice, or bag of frozen vegetables on the injured area every 1 to 2 hours, for 15 minutes each time. Put a thin towel between the ice (or other cold object) and your skin. Use the ice (or other cold object) for at least 6 hours after your injury. Some people find it helpful to ice longer, even up to 2 days after their injury.    ?Raise the area, if possible – Raising the area above the level of your heart helps to reduce swelling.    ?Take medicine to reduce the pain and swelling – To treat pain, you can take acetaminophen (sample brand name: Tylenol). To treat pain and swelling, you can take ibuprofen (sample brand names: Advil, Motrin). But people who have certain conditions or take certain medicines should not take ibuprofen. If you are unsure, ask your doctor or nurse if you can take ibuprofen.    ?Use an elastic bandage – Using an elastic "compression" bandage to keep pressure on the area can reduce swelling. Be careful not to wrap the bandage too tightly. For most injuries, you can use the bandage during the first few days of healing, but take it off when you sleep.    If you have another injury in the same area, like a sprained ankle, you can continue to use the elastic bandage as the injury heals.    Do not use heat packs or a heating pad during the first 48 hours after injury. Heat can increase swelling and pain soon after an injury.    Do not stick a needle or other object in your bruise to drain it.    When should I call the doctor or nurse?  Call your doctor or nurse if:    ?You get a fever    ?Your bruise causes your joints to swell    ?You can't move or walk because of your bruise    ?You get bruises for no reason or have unusual bleeding, such as from your gums or in your urine    More on this topic

## 2024-05-07 NOTE — ED ADULT TRIAGE NOTE - CHIEF COMPLAINT QUOTE
bruising and abrasion to right heel of foot and left shin. pt unsure of they happened. + ambulatory + pulse

## 2024-05-08 ENCOUNTER — APPOINTMENT (OUTPATIENT)
Dept: NEUROSURGERY | Facility: CLINIC | Age: 79
End: 2024-05-08
Payer: MEDICARE

## 2024-05-08 VITALS
SYSTOLIC BLOOD PRESSURE: 118 MMHG | WEIGHT: 103.6 LBS | HEIGHT: 59.5 IN | HEART RATE: 96 BPM | TEMPERATURE: 98 F | OXYGEN SATURATION: 96 % | DIASTOLIC BLOOD PRESSURE: 74 MMHG | BODY MASS INDEX: 20.61 KG/M2

## 2024-05-08 DIAGNOSIS — S32.10XA UNSPECIFIED FRACTURE OF SACRUM, INITIAL ENCOUNTER FOR CLOSED FRACTURE: ICD-10-CM

## 2024-05-08 DIAGNOSIS — M54.9 DORSALGIA, UNSPECIFIED: ICD-10-CM

## 2024-05-08 PROCEDURE — 99212 OFFICE O/P EST SF 10 MIN: CPT

## 2024-05-08 NOTE — PHYSICAL EXAM
[FreeTextEntry1] : BLE 5/5 strength Right ankle has a focal area of swelling, painful to touch, bruised

## 2024-05-08 NOTE — HISTORY OF PRESENT ILLNESS
[FreeTextEntry1] : Ms. Aparna Beck is a very pleasant 78 year old female with PMH of brain surgery in 2019, lumbar surgery many years ago who presents for follow-up of low back pain and occasional leg pain since December that has been managed conservatively with pain management and physical therapy.  I will last saw her at the end of March and at that time she was doing well.  She has been able to walk her dog and has been taking aspirin for pain.  She did have an issue with her right leg yesterday that prompted her to go to the emergency department.  X-rays and Dopplers were negative for any acute abnormality and she was discharged home.  She has been seeing pain management.

## 2024-05-08 NOTE — ASSESSMENT
[FreeTextEntry1] : Ms. Aparna Beck is a very pleasant 78 year old female with PMH of brain surgery in 2019, lumbar surgery many years ago who presents for follow-up of low back pain and occasional leg pain since December that has been managed conservatively with pain management and physical therapy. She continues to do well and is walking. I am pleased with her progress.  I advised that she should avoid taking aspirin.  I will see her back in 2 to 3 months.  All questions answered.  She knows to call my office with any issues or concerns

## 2024-06-24 ENCOUNTER — NON-APPOINTMENT (OUTPATIENT)
Age: 79
End: 2024-06-24

## 2024-08-07 ENCOUNTER — APPOINTMENT (OUTPATIENT)
Dept: NEUROSURGERY | Facility: CLINIC | Age: 79
End: 2024-08-07

## 2024-08-28 ENCOUNTER — EMERGENCY (EMERGENCY)
Facility: HOSPITAL | Age: 79
LOS: 1 days | Discharge: HOME | End: 2024-08-28
Attending: STUDENT IN AN ORGANIZED HEALTH CARE EDUCATION/TRAINING PROGRAM
Payer: MEDICARE

## 2024-08-28 VITALS
HEIGHT: 64 IN | HEART RATE: 108 BPM | DIASTOLIC BLOOD PRESSURE: 80 MMHG | SYSTOLIC BLOOD PRESSURE: 137 MMHG | RESPIRATION RATE: 16 BRPM | TEMPERATURE: 99 F | WEIGHT: 104.94 LBS | OXYGEN SATURATION: 95 %

## 2024-08-28 VITALS
TEMPERATURE: 98 F | DIASTOLIC BLOOD PRESSURE: 99 MMHG | RESPIRATION RATE: 18 BRPM | SYSTOLIC BLOOD PRESSURE: 173 MMHG | OXYGEN SATURATION: 97 % | HEART RATE: 99 BPM

## 2024-08-28 DIAGNOSIS — Z98.89 OTHER SPECIFIED POSTPROCEDURAL STATES: Chronic | ICD-10-CM

## 2024-08-28 DIAGNOSIS — Z90.89 ACQUIRED ABSENCE OF OTHER ORGANS: Chronic | ICD-10-CM

## 2024-08-28 DIAGNOSIS — Z90.81 ACQUIRED ABSENCE OF SPLEEN: Chronic | ICD-10-CM

## 2024-08-28 LAB
ALBUMIN SERPL ELPH-MCNC: 4.2 G/DL — SIGNIFICANT CHANGE UP (ref 3.3–5.2)
ALP SERPL-CCNC: 77 U/L — SIGNIFICANT CHANGE UP (ref 40–120)
ALT FLD-CCNC: 14 U/L — SIGNIFICANT CHANGE UP
AMPHET UR-MCNC: NEGATIVE — SIGNIFICANT CHANGE UP
ANION GAP SERPL CALC-SCNC: 13 MMOL/L — SIGNIFICANT CHANGE UP (ref 5–17)
APAP SERPL-MCNC: <3 UG/ML — LOW (ref 10–26)
APPEARANCE UR: CLEAR — SIGNIFICANT CHANGE UP
AST SERPL-CCNC: 36 U/L — HIGH
BACTERIA # UR AUTO: NEGATIVE /HPF — SIGNIFICANT CHANGE UP
BARBITURATES UR SCN-MCNC: NEGATIVE — SIGNIFICANT CHANGE UP
BASOPHILS # BLD AUTO: 0.08 K/UL — SIGNIFICANT CHANGE UP (ref 0–0.2)
BASOPHILS NFR BLD AUTO: 0.9 % — SIGNIFICANT CHANGE UP (ref 0–2)
BENZODIAZ UR-MCNC: NEGATIVE — SIGNIFICANT CHANGE UP
BILIRUB SERPL-MCNC: 0.2 MG/DL — LOW (ref 0.4–2)
BILIRUB UR-MCNC: NEGATIVE — SIGNIFICANT CHANGE UP
BUN SERPL-MCNC: 12.6 MG/DL — SIGNIFICANT CHANGE UP (ref 8–20)
CALCIUM SERPL-MCNC: 9 MG/DL — SIGNIFICANT CHANGE UP (ref 8.4–10.5)
CAST: 0 /LPF — SIGNIFICANT CHANGE UP (ref 0–4)
CHLORIDE SERPL-SCNC: 104 MMOL/L — SIGNIFICANT CHANGE UP (ref 96–108)
CO2 SERPL-SCNC: 24 MMOL/L — SIGNIFICANT CHANGE UP (ref 22–29)
COCAINE METAB.OTHER UR-MCNC: NEGATIVE — SIGNIFICANT CHANGE UP
COLOR SPEC: YELLOW — SIGNIFICANT CHANGE UP
CREAT SERPL-MCNC: 0.45 MG/DL — LOW (ref 0.5–1.3)
DIFF PNL FLD: NEGATIVE — SIGNIFICANT CHANGE UP
EGFR: 97 ML/MIN/1.73M2 — SIGNIFICANT CHANGE UP
EOSINOPHIL # BLD AUTO: 0.08 K/UL — SIGNIFICANT CHANGE UP (ref 0–0.5)
EOSINOPHIL NFR BLD AUTO: 0.9 % — SIGNIFICANT CHANGE UP (ref 0–6)
ETHANOL SERPL-MCNC: <10 MG/DL — SIGNIFICANT CHANGE UP (ref 0–9)
FENTANYL UR QL SCN: NEGATIVE — SIGNIFICANT CHANGE UP
GLUCOSE SERPL-MCNC: 117 MG/DL — HIGH (ref 70–99)
GLUCOSE UR QL: NEGATIVE MG/DL — SIGNIFICANT CHANGE UP
HCT VFR BLD CALC: 35.9 % — SIGNIFICANT CHANGE UP (ref 34.5–45)
HGB BLD-MCNC: 11.3 G/DL — LOW (ref 11.5–15.5)
IMM GRANULOCYTES NFR BLD AUTO: 0.2 % — SIGNIFICANT CHANGE UP (ref 0–0.9)
KETONES UR-MCNC: NEGATIVE MG/DL — SIGNIFICANT CHANGE UP
LEUKOCYTE ESTERASE UR-ACNC: ABNORMAL
LYMPHOCYTES # BLD AUTO: 1.53 K/UL — SIGNIFICANT CHANGE UP (ref 1–3.3)
LYMPHOCYTES # BLD AUTO: 16.7 % — SIGNIFICANT CHANGE UP (ref 13–44)
MCHC RBC-ENTMCNC: 31.5 GM/DL — LOW (ref 32–36)
MCHC RBC-ENTMCNC: 31.8 PG — SIGNIFICANT CHANGE UP (ref 27–34)
MCV RBC AUTO: 101.1 FL — HIGH (ref 80–100)
METHADONE UR-MCNC: NEGATIVE — SIGNIFICANT CHANGE UP
MONOCYTES # BLD AUTO: 0.67 K/UL — SIGNIFICANT CHANGE UP (ref 0–0.9)
MONOCYTES NFR BLD AUTO: 7.3 % — SIGNIFICANT CHANGE UP (ref 2–14)
NEUTROPHILS # BLD AUTO: 6.78 K/UL — SIGNIFICANT CHANGE UP (ref 1.8–7.4)
NEUTROPHILS NFR BLD AUTO: 74 % — SIGNIFICANT CHANGE UP (ref 43–77)
NITRITE UR-MCNC: NEGATIVE — SIGNIFICANT CHANGE UP
OPIATES UR-MCNC: NEGATIVE — SIGNIFICANT CHANGE UP
PCP SPEC-MCNC: SIGNIFICANT CHANGE UP
PCP UR-MCNC: NEGATIVE — SIGNIFICANT CHANGE UP
PH UR: 6.5 — SIGNIFICANT CHANGE UP (ref 5–8)
PLATELET # BLD AUTO: 251 K/UL — SIGNIFICANT CHANGE UP (ref 150–400)
POTASSIUM SERPL-MCNC: 4.3 MMOL/L — SIGNIFICANT CHANGE UP (ref 3.5–5.3)
POTASSIUM SERPL-SCNC: 4.3 MMOL/L — SIGNIFICANT CHANGE UP (ref 3.5–5.3)
PROT SERPL-MCNC: 6.6 G/DL — SIGNIFICANT CHANGE UP (ref 6.6–8.7)
PROT UR-MCNC: NEGATIVE MG/DL — SIGNIFICANT CHANGE UP
RBC # BLD: 3.55 M/UL — LOW (ref 3.8–5.2)
RBC # FLD: 14.6 % — HIGH (ref 10.3–14.5)
RBC CASTS # UR COMP ASSIST: 0 /HPF — SIGNIFICANT CHANGE UP (ref 0–4)
SALICYLATES SERPL-MCNC: <0.6 MG/DL — LOW (ref 10–20)
SODIUM SERPL-SCNC: 141 MMOL/L — SIGNIFICANT CHANGE UP (ref 135–145)
SP GR SPEC: 1.01 — SIGNIFICANT CHANGE UP (ref 1–1.03)
SQUAMOUS # UR AUTO: 2 /HPF — SIGNIFICANT CHANGE UP (ref 0–5)
THC UR QL: NEGATIVE — SIGNIFICANT CHANGE UP
UROBILINOGEN FLD QL: 0.2 MG/DL — SIGNIFICANT CHANGE UP (ref 0.2–1)
WBC # BLD: 9.16 K/UL — SIGNIFICANT CHANGE UP (ref 3.8–10.5)
WBC # FLD AUTO: 9.16 K/UL — SIGNIFICANT CHANGE UP (ref 3.8–10.5)
WBC UR QL: 1 /HPF — SIGNIFICANT CHANGE UP (ref 0–5)

## 2024-08-28 PROCEDURE — 81001 URINALYSIS AUTO W/SCOPE: CPT

## 2024-08-28 PROCEDURE — 87086 URINE CULTURE/COLONY COUNT: CPT

## 2024-08-28 PROCEDURE — 93010 ELECTROCARDIOGRAM REPORT: CPT

## 2024-08-28 PROCEDURE — 85025 COMPLETE CBC W/AUTO DIFF WBC: CPT

## 2024-08-28 PROCEDURE — 36415 COLL VENOUS BLD VENIPUNCTURE: CPT

## 2024-08-28 PROCEDURE — 99284 EMERGENCY DEPT VISIT MOD MDM: CPT | Mod: 25

## 2024-08-28 PROCEDURE — 80307 DRUG TEST PRSMV CHEM ANLYZR: CPT

## 2024-08-28 PROCEDURE — 93005 ELECTROCARDIOGRAM TRACING: CPT

## 2024-08-28 PROCEDURE — 80053 COMPREHEN METABOLIC PANEL: CPT

## 2024-08-28 PROCEDURE — 99285 EMERGENCY DEPT VISIT HI MDM: CPT

## 2024-08-28 NOTE — ED PROVIDER NOTE - DATE/TIME 1
May 12, 2022      Re: Oz Patel (2023)         To Whom It May Concern:    Oz Patel (2023) was seen on 22.  Please allow his father Oz Patel to stay home from work an additional 1 week to care for his  son, due to a medical condition (hyperbilirubinemia).  Thank you for your attention to this matter.      Sincerely,        Sonali Becker PA-C     28-Aug-2024 19:49
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
## 2024-08-28 NOTE — ED PROVIDER NOTE - PROGRESS NOTE DETAILS
Federico: Pt received in signout from Dr. Sullivan. Pt cleared by psychiatry. SW met with family to provide resources. Family comfortable taking pt home. Stable for discharge.
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
## 2024-08-28 NOTE — ED ADULT NURSE NOTE - NSSEPSISSUSPECTED_ED_A_ED
1. Recommend liberalize diet to regular to maximize caloric and nutrient intake.   2. Recommend ensure plus high protein BID to optimize PO intake (provides 350 kcal, 20g protein/ shake)   3. Consider adding thiamine 100 mg daily 2/2 poor PO intake/ malnutrition   4. C/w MVI w/ minerals daily to ensure 100% RDA met   5. Encourage protein-rich foods, maximize food preferences   6. Monitor bowel movements, c/w bowel regimen (senna daily, miralax PRN)  7. Obtain vitamin D 25OH level to assess nutriture   8. Please obtain current and weekly weights   9. Confirm goals of care regarding nutrition support   RD will continue to monitor PO intake, labs, hydration, and wt prn.  No
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
## 2024-08-28 NOTE — ED ADULT NURSE REASSESSMENT NOTE - NS ED NURSE REASSESS COMMENT FT1
Patient discharged by provider MD Caballero. No signs of acute distress noted, respirations even and unlabored. Refer to provider notes.

## 2024-08-28 NOTE — ED ADULT NURSE REASSESSMENT NOTE - NS ED NURSE REASSESS COMMENT FT1
Pt resting in bed, respirations equal and unlabored on room air. No signs of acute distress noted at this time. Pt left in position of comfort, wheels of stretcher locked and in the lowest position.
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
## 2024-08-28 NOTE — ED ADULT NURSE NOTE - NSFALLRISKINTERV_ED_ALL_ED
Assistance OOB with selected safe patient handling equipment if applicable/Assistance with ambulation/Communicate fall risk and risk factors to all staff, patient, and family/Monitor gait and stability/Monitor for mental status changes and reorient to person, place, and time, as needed/Provide visual cue: yellow wristband, yellow gown, etc/Reinforce activity limits and safety measures with patient and family/Toileting schedule using arm’s reach rule for commode and bathroom/Use of alarms - bed, stretcher, chair and/or video monitoring/Call bell, personal items and telephone in reach/Instruct patient to call for assistance before getting out of bed/chair/stretcher/Non-slip footwear applied when patient is off stretcher/[place] to call system/Physically safe environment - no spills, clutter or unnecessary equipment/Purposeful Proactive Rounding/Room/bathroom lighting operational, light cord in reach
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
## 2024-08-28 NOTE — ED PROVIDER NOTE - CARE PROVIDER_API CALL
Alisa Dyer  Neurology  [address], [address]  [place] [address]  Phone: [phone]  Fax: [phone]  Follow Up Time:

## 2024-08-28 NOTE — ED PROVIDER NOTE - PHYSICAL EXAMINATION
Vital Signs per nursing documentation  Gen: well appearing, no acute distress  HEENT: NCAT, MMM  Cardiac: regular rate rhythm, normal S1S2  Chest: clear to auscultation bilateral, no wheezes or crackles  Abdomen: soft, non tender non distended  Extremity: no gross deformity, good perfusion  Skin: bruising to right tib  Neuro: nonfocal neuro exam, gait steady

## 2024-08-28 NOTE — ED PROVIDER NOTE - PATIENT PORTAL LINK FT
You can access the FollowMyHealth Patient Portal offered by [facility] by registering at the following website: [email or web address]. By joining [employer]’s FollowMyHealth portal, you will also be able to view your health information using other applications (apps) compatible with our system.

## 2024-08-28 NOTE — ED PROVIDER NOTE - NSFOLLOWUPINSTRUCTIONS_ED_ALL_ED_FT
Dementia  Dementia is a condition that affects the way the brain works. It often affects thinking and memory.    There are many types of dementia, including:  Alzheimer's disease. This is the most common type.  Vascular dementia. This type may happen due to a stroke.  Lewy body dementia. This type may happen to people who have Parkinson's disease.  Frontotemporal dementia. This type is caused by damage to nerve cells in certain parts of the brain.  Some people may have more than one type.    What are the causes?  Dementia is caused by damage to cells in the brain. Some causes that can't be reversed include:  Having a condition that affects the blood vessels of the brain. This may be diabetes or heart disease.  Changes to genes.  Some causes that can be reversed or slowed down include:  Injury to the brain due to:  A growth called a tumor.  A blood clot.  Too much fluid in the brain.  Taking certain medicines.  An infection.  Problems with your thyroid.  Not having enough vitamin B12 in the body.  Having a disease that causes your body's defense system, called the immune system, to attack healthy parts of your body.  What are the signs or symptoms?  Symptoms of dementia start slowly and get worse with time. They may include:  Problems remembering events or people.  Getting lost easily.  Forgetting appointments or to pay bills.  Having trouble taking a bath or putting clothes on.  Having trouble planning and making meals.  Having trouble speaking.  Changes in behavior or mood.  How is this diagnosed?  Dementia may be diagnosed based on:  Your symptoms and medical history.  A physical exam.  Tests. These may include:  Tests to check your thinking and memory to see how your brain is working.  Lab tests. You may have tests on your blood or pee (urine).  Imaging tests, such as a CT scan, a PET scan, or an MRI.  Genetic testing. This may be done if other family members have had dementia.  Your health care provider will talk with you and your family, friends, or caregivers about your history and symptoms.    How is this treated?  Treatment depends on the cause of the dementia and should start as soon as possible. It might include:  Taking medicines for symptoms.  Taking medicines to help control or slow down the dementia.  Treating the cause of your dementia.  Your provider can help you find support groups and other members of the health care team who can help with your care.    Follow these instructions at home:  Medicines    Take medicines only as told by your provider.  Use a pill organizer or pill reminder to help you keep track of your medicines.  Avoid taking medicines for pain or for sleep. These can affect your thinking.  Lifestyle    Make healthy choices.  Be active as told by your provider.  Do not smoke, vape, or use products with nicotine or tobacco in them. If you need help quitting, talk with your provider.  Do not drink alcohol.  When you feel a lot of stress, do something that helps you relax. Your provider can give you tips.  Spend time with other people.  Make sure you get good sleep at night. These tips can help:  Try not to take naps during the day.  Keep your bedroom dark and cool.  Do not exercise in the few hours before you go to bed.  Do not have foods or drinks with caffeine at night.  Eating and drinking    Drink enough fluid to keep your pee pale yellow.  Eat a healthy diet.  General instructions    A medical alert bracelet on a person's wrist.  Talk with your provider to decide on:  What things you need help with.  What your safety needs are.  Ask your provider if it's safe for you to drive.  If told, wear a bracelet that tracks where you are or shows that you're a person with memory loss.  Work with your family to make big legal or health decisions. This may include things like advance directives, medical power of , or a living will.  Where to find more information  Alzheimer's Association: alz.org  [employer] [place] on Aging: albert.nih.gov  World Health Organization: who.int  Contact a health care provider if:  You have any new symptoms.  Your symptoms get worse.  You have problems with swallowing.  Get help right away if:  You feel very sad or feel like you may hurt yourself or others.  You have thoughts about taking your own life.  Your family members are worried about your safety.  These symptoms may be an emergency. Take one of these steps right away:  Go to your nearest emergency room.  Call 659.  Call the National Suicide Prevention Lifeline at 1-603.268.1778 or 222.  Text the Crisis Text Line at 791430.  This information is not intended to replace advice given to you by your health care provider. Make sure you discuss any questions you have with your health care provider.

## 2024-08-29 ENCOUNTER — NON-APPOINTMENT (OUTPATIENT)
Age: 79
End: 2024-08-29

## 2024-08-29 DIAGNOSIS — M54.9 DORSALGIA, UNSPECIFIED: ICD-10-CM

## 2024-08-29 LAB
CULTURE RESULTS: SIGNIFICANT CHANGE UP
SPECIMEN SOURCE: SIGNIFICANT CHANGE UP

## 2024-09-11 ENCOUNTER — APPOINTMENT (OUTPATIENT)
Dept: NEUROSURGERY | Facility: CLINIC | Age: 79
End: 2024-09-11

## 2025-06-25 ENCOUNTER — EMERGENCY (EMERGENCY)
Facility: HOSPITAL | Age: 80
LOS: 1 days | End: 2025-06-25
Attending: STUDENT IN AN ORGANIZED HEALTH CARE EDUCATION/TRAINING PROGRAM
Payer: MEDICARE

## 2025-06-25 VITALS
OXYGEN SATURATION: 96 % | DIASTOLIC BLOOD PRESSURE: 92 MMHG | HEART RATE: 79 BPM | TEMPERATURE: 98 F | SYSTOLIC BLOOD PRESSURE: 165 MMHG | RESPIRATION RATE: 18 BRPM | WEIGHT: 125 LBS

## 2025-06-25 VITALS
SYSTOLIC BLOOD PRESSURE: 148 MMHG | OXYGEN SATURATION: 98 % | TEMPERATURE: 98 F | DIASTOLIC BLOOD PRESSURE: 90 MMHG | HEART RATE: 64 BPM | RESPIRATION RATE: 18 BRPM

## 2025-06-25 DIAGNOSIS — Z98.89 OTHER SPECIFIED POSTPROCEDURAL STATES: Chronic | ICD-10-CM

## 2025-06-25 DIAGNOSIS — Z90.89 ACQUIRED ABSENCE OF OTHER ORGANS: Chronic | ICD-10-CM

## 2025-06-25 DIAGNOSIS — Z90.81 ACQUIRED ABSENCE OF SPLEEN: Chronic | ICD-10-CM

## 2025-06-25 LAB
ALBUMIN SERPL ELPH-MCNC: 4.5 G/DL — SIGNIFICANT CHANGE UP (ref 3.3–5.2)
ALP SERPL-CCNC: 131 U/L — HIGH (ref 40–120)
ALT FLD-CCNC: 16 U/L — SIGNIFICANT CHANGE UP
AMPHET UR-MCNC: NEGATIVE — SIGNIFICANT CHANGE UP
ANION GAP SERPL CALC-SCNC: 14 MMOL/L — SIGNIFICANT CHANGE UP (ref 5–17)
APAP SERPL-MCNC: 5.9 UG/ML — LOW (ref 10–26)
APPEARANCE UR: CLEAR — SIGNIFICANT CHANGE UP
AST SERPL-CCNC: 31 U/L — SIGNIFICANT CHANGE UP
BACTERIA # UR AUTO: NEGATIVE /HPF — SIGNIFICANT CHANGE UP
BARBITURATES UR SCN-MCNC: NEGATIVE — SIGNIFICANT CHANGE UP
BASOPHILS # BLD AUTO: 0.1 K/UL — SIGNIFICANT CHANGE UP (ref 0–0.2)
BASOPHILS NFR BLD AUTO: 1.3 % — SIGNIFICANT CHANGE UP (ref 0–2)
BENZODIAZ UR-MCNC: NEGATIVE — SIGNIFICANT CHANGE UP
BILIRUB SERPL-MCNC: 0.3 MG/DL — LOW (ref 0.4–2)
BILIRUB UR-MCNC: NEGATIVE — SIGNIFICANT CHANGE UP
BUN SERPL-MCNC: 22.7 MG/DL — HIGH (ref 8–20)
C DIFF BY PCR RESULT: SIGNIFICANT CHANGE UP
CALCIUM SERPL-MCNC: 9.6 MG/DL — SIGNIFICANT CHANGE UP (ref 8.4–10.5)
CAST: 1 /LPF — SIGNIFICANT CHANGE UP (ref 0–4)
CHLORIDE SERPL-SCNC: 107 MMOL/L — SIGNIFICANT CHANGE UP (ref 96–108)
CO2 SERPL-SCNC: 23 MMOL/L — SIGNIFICANT CHANGE UP (ref 22–29)
COCAINE METAB.OTHER UR-MCNC: NEGATIVE — SIGNIFICANT CHANGE UP
COLOR SPEC: YELLOW — SIGNIFICANT CHANGE UP
CREAT SERPL-MCNC: 0.49 MG/DL — LOW (ref 0.5–1.3)
DIFF PNL FLD: NEGATIVE — SIGNIFICANT CHANGE UP
EGFR: 95 ML/MIN/1.73M2 — SIGNIFICANT CHANGE UP
EGFR: 95 ML/MIN/1.73M2 — SIGNIFICANT CHANGE UP
EOSINOPHIL # BLD AUTO: 0.08 K/UL — SIGNIFICANT CHANGE UP (ref 0–0.5)
EOSINOPHIL NFR BLD AUTO: 1.1 % — SIGNIFICANT CHANGE UP (ref 0–6)
FENTANYL UR QL SCN: NEGATIVE — SIGNIFICANT CHANGE UP
FLUAV AG NPH QL: SIGNIFICANT CHANGE UP
FLUBV AG NPH QL: SIGNIFICANT CHANGE UP
GAS PNL BLDV: SIGNIFICANT CHANGE UP
GLUCOSE SERPL-MCNC: 103 MG/DL — HIGH (ref 70–99)
GLUCOSE UR QL: NEGATIVE MG/DL — SIGNIFICANT CHANGE UP
HCT VFR BLD CALC: 36.5 % — SIGNIFICANT CHANGE UP (ref 34.5–45)
HGB BLD-MCNC: 12.1 G/DL — SIGNIFICANT CHANGE UP (ref 11.5–15.5)
IMM GRANULOCYTES # BLD AUTO: 0.02 K/UL — SIGNIFICANT CHANGE UP (ref 0–0.07)
IMM GRANULOCYTES NFR BLD AUTO: 0.3 % — SIGNIFICANT CHANGE UP (ref 0–0.9)
KETONES UR QL: 15 MG/DL
LEUKOCYTE ESTERASE UR-ACNC: NEGATIVE — SIGNIFICANT CHANGE UP
LIDOCAIN IGE QN: 125 U/L — HIGH (ref 22–51)
LYMPHOCYTES # BLD AUTO: 2.72 K/UL — SIGNIFICANT CHANGE UP (ref 1–3.3)
LYMPHOCYTES NFR BLD AUTO: 36.4 % — SIGNIFICANT CHANGE UP (ref 13–44)
MCHC RBC-ENTMCNC: 33.2 G/DL — SIGNIFICANT CHANGE UP (ref 32–36)
MCHC RBC-ENTMCNC: 33.9 PG — SIGNIFICANT CHANGE UP (ref 27–34)
MCV RBC AUTO: 102.2 FL — HIGH (ref 80–100)
METHADONE UR-MCNC: NEGATIVE — SIGNIFICANT CHANGE UP
MONOCYTES # BLD AUTO: 0.7 K/UL — SIGNIFICANT CHANGE UP (ref 0–0.9)
MONOCYTES NFR BLD AUTO: 9.4 % — SIGNIFICANT CHANGE UP (ref 2–14)
NEUTROPHILS # BLD AUTO: 3.86 K/UL — SIGNIFICANT CHANGE UP (ref 1.8–7.4)
NEUTROPHILS NFR BLD AUTO: 51.5 % — SIGNIFICANT CHANGE UP (ref 43–77)
NITRITE UR-MCNC: NEGATIVE — SIGNIFICANT CHANGE UP
NRBC # BLD AUTO: 0 K/UL — SIGNIFICANT CHANGE UP (ref 0–0)
NRBC # FLD: 0 K/UL — SIGNIFICANT CHANGE UP (ref 0–0)
NRBC BLD AUTO-RTO: 0 /100 WBCS — SIGNIFICANT CHANGE UP (ref 0–0)
OPIATES UR-MCNC: NEGATIVE — SIGNIFICANT CHANGE UP
PCP SPEC-MCNC: SIGNIFICANT CHANGE UP
PCP UR-MCNC: NEGATIVE — SIGNIFICANT CHANGE UP
PH UR: 5.5 — SIGNIFICANT CHANGE UP (ref 5–8)
PLATELET # BLD AUTO: 204 K/UL — SIGNIFICANT CHANGE UP (ref 150–400)
PMV BLD: 13.8 FL — HIGH (ref 7–13)
POTASSIUM SERPL-MCNC: 4.1 MMOL/L — SIGNIFICANT CHANGE UP (ref 3.5–5.3)
POTASSIUM SERPL-SCNC: 4.1 MMOL/L — SIGNIFICANT CHANGE UP (ref 3.5–5.3)
PROT SERPL-MCNC: 7.2 G/DL — SIGNIFICANT CHANGE UP (ref 6.6–8.7)
PROT UR-MCNC: 30 MG/DL
RBC # BLD: 3.57 M/UL — LOW (ref 3.8–5.2)
RBC # FLD: 13.6 % — SIGNIFICANT CHANGE UP (ref 10.3–14.5)
RBC CASTS # UR COMP ASSIST: 1 /HPF — SIGNIFICANT CHANGE UP (ref 0–4)
RSV RNA NPH QL NAA+NON-PROBE: SIGNIFICANT CHANGE UP
SALICYLATES SERPL-MCNC: <0.6 MG/DL — LOW (ref 10–20)
SARS-COV-2 RNA SPEC QL NAA+PROBE: SIGNIFICANT CHANGE UP
SODIUM SERPL-SCNC: 144 MMOL/L — SIGNIFICANT CHANGE UP (ref 135–145)
SOURCE RESPIRATORY: SIGNIFICANT CHANGE UP
SP GR SPEC: >1.03 — HIGH (ref 1–1.03)
SQUAMOUS # UR AUTO: 5 /HPF — SIGNIFICANT CHANGE UP (ref 0–5)
THC UR QL: NEGATIVE — SIGNIFICANT CHANGE UP
UROBILINOGEN FLD QL: 1 MG/DL — SIGNIFICANT CHANGE UP (ref 0.2–1)
WBC # BLD: 7.48 K/UL — SIGNIFICANT CHANGE UP (ref 3.8–10.5)
WBC # FLD AUTO: 7.48 K/UL — SIGNIFICANT CHANGE UP (ref 3.8–10.5)
WBC UR QL: 6 /HPF — HIGH (ref 0–5)

## 2025-06-25 PROCEDURE — 93010 ELECTROCARDIOGRAM REPORT: CPT

## 2025-06-25 PROCEDURE — 85014 HEMATOCRIT: CPT

## 2025-06-25 PROCEDURE — 84132 ASSAY OF SERUM POTASSIUM: CPT

## 2025-06-25 PROCEDURE — 82947 ASSAY GLUCOSE BLOOD QUANT: CPT

## 2025-06-25 PROCEDURE — 85025 COMPLETE CBC W/AUTO DIFF WBC: CPT

## 2025-06-25 PROCEDURE — 85018 HEMOGLOBIN: CPT

## 2025-06-25 PROCEDURE — 87177 OVA AND PARASITES SMEARS: CPT

## 2025-06-25 PROCEDURE — 93005 ELECTROCARDIOGRAM TRACING: CPT

## 2025-06-25 PROCEDURE — 82435 ASSAY OF BLOOD CHLORIDE: CPT

## 2025-06-25 PROCEDURE — 83605 ASSAY OF LACTIC ACID: CPT

## 2025-06-25 PROCEDURE — 80307 DRUG TEST PRSMV CHEM ANLYZR: CPT

## 2025-06-25 PROCEDURE — 36415 COLL VENOUS BLD VENIPUNCTURE: CPT

## 2025-06-25 PROCEDURE — 87637 SARSCOV2&INF A&B&RSV AMP PRB: CPT

## 2025-06-25 PROCEDURE — 80053 COMPREHEN METABOLIC PANEL: CPT

## 2025-06-25 PROCEDURE — 74177 CT ABD & PELVIS W/CONTRAST: CPT

## 2025-06-25 PROCEDURE — 99285 EMERGENCY DEPT VISIT HI MDM: CPT | Mod: 25

## 2025-06-25 PROCEDURE — 87493 C DIFF AMPLIFIED PROBE: CPT

## 2025-06-25 PROCEDURE — 82330 ASSAY OF CALCIUM: CPT

## 2025-06-25 PROCEDURE — 83690 ASSAY OF LIPASE: CPT

## 2025-06-25 PROCEDURE — 84295 ASSAY OF SERUM SODIUM: CPT

## 2025-06-25 PROCEDURE — 74177 CT ABD & PELVIS W/CONTRAST: CPT | Mod: 26

## 2025-06-25 PROCEDURE — 99285 EMERGENCY DEPT VISIT HI MDM: CPT

## 2025-06-25 PROCEDURE — 81001 URINALYSIS AUTO W/SCOPE: CPT

## 2025-06-25 PROCEDURE — 0241U: CPT

## 2025-06-25 PROCEDURE — 82803 BLOOD GASES ANY COMBINATION: CPT

## 2025-06-25 RX ADMIN — Medication 1000 MILLILITER(S): at 20:10

## 2025-06-25 NOTE — ED ADULT TRIAGE NOTE - CHIEF COMPLAINT QUOTE
Pt BIBA c/o vomiting, diarrhea.   Onset today.  Pt denies chest pain.  Reports some accompanying LUQ pain.  Pt has hx of mild dementia.

## 2025-06-25 NOTE — ED ADULT NURSE NOTE - OBJECTIVE STATEMENT
pt presents for diarrhea x months, coming in for eval today. at baseline mental status, no acute distress resps even unlabored, ambulatory with steady gait.

## 2025-06-25 NOTE — ED PROVIDER NOTE - PATIENT PORTAL LINK FT
You can access the FollowMyHealth Patient Portal offered by [facility] by registering at the following website: [email or web address]. By joining [employer]’s FollowMyHealth portal, you will also be able to view your health information using other applications (apps) compatible with our system.

## 2025-06-25 NOTE — ED PROVIDER NOTE - CARE PROVIDER_API CALL
April Arredondo  Gastroenterology  [address], [address]  [place] [address]  Phone: [phone]  Fax: [phone]  Follow Up Time:

## 2025-06-25 NOTE — ED ADULT NURSE NOTE - NSFALLRISKINTERV_ED_ALL_ED
Assistance OOB with selected safe patient handling equipment if applicable/Communicate fall risk and risk factors to all staff, patient, and family/Provide visual cue: yellow wristband, yellow gown, etc/Reinforce activity limits and safety measures with patient and family/Toileting schedule using arm’s reach rule for commode and bathroom/Call bell, personal items and telephone in reach/Instruct patient to call for assistance before getting out of bed/chair/stretcher/Non-slip footwear applied when patient is off stretcher/[place] to call system/Physically safe environment - no spills, clutter or unnecessary equipment/Purposeful Proactive Rounding/Room/bathroom lighting operational, light cord in reach
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
## 2025-06-25 NOTE — ED PROVIDER NOTE - PROGRESS NOTE DETAILS
Federico: Pt received in signout from Dr. Mcclelland. CT suggestive of enterocolitis. No other actionable findings on workup. Advised to f/u with GI as outpatient. Pt agreeable to plan.

## 2025-06-28 DIAGNOSIS — R19.7 DIARRHEA, UNSPECIFIED: ICD-10-CM

## 2025-06-28 DIAGNOSIS — F03.90 UNSPECIFIED DEMENTIA, UNSPECIFIED SEVERITY, WITHOUT BEHAVIORAL DISTURBANCE, PSYCHOTIC DISTURBANCE, MOOD DISTURBANCE, AND ANXIETY: ICD-10-CM

## 2025-06-28 DIAGNOSIS — R11.10 VOMITING, UNSPECIFIED: ICD-10-CM

## 2025-06-28 LAB
CULTURE RESULTS: SIGNIFICANT CHANGE UP
SPECIMEN SOURCE: SIGNIFICANT CHANGE UP